# Patient Record
Sex: FEMALE | URBAN - METROPOLITAN AREA
[De-identification: names, ages, dates, MRNs, and addresses within clinical notes are randomized per-mention and may not be internally consistent; named-entity substitution may affect disease eponyms.]

---

## 2023-07-12 ENCOUNTER — NURSE TRIAGE (OUTPATIENT)
Dept: ADMINISTRATIVE | Facility: CLINIC | Age: 28
End: 2023-07-12

## 2023-07-12 NOTE — TELEPHONE ENCOUNTER
[place]    PCP:  Dr. Ascencion Pitts    She reports seeing a Specialist for wt loss and gastroparesis.  She reports having a lot of trouble eating at all.  C/O upper abdominal pain rated 3/10, loss of appetite, pain with eating/drinking, sweating d/t anxiety, ongoing back pain that is unchanged, dry mouth, fatigue, poor focus, symptoms are worse than normal, suspects dehydration, and nausea.  Toast with peanut butter for breakfast.  She reports haven't really been able to eat for the last 3 days.  She reports that she's tried everything.  Denies CP, difficulty breathing, diarrhea, fever, and urination pain.  Per protocol, care advised is go to the ED now.  Pt VU.  Advised to call for worsening/questions/concerns.  VU.  Unable to route d/t non-[facility] provider.    Reason for Disposition   Patient sounds very sick or weak to the triager    Additional Information   Negative: Passed out (i.e., fainted, collapsed and was not responding)   Negative: Shock suspected (e.g., cold/pale/clammy skin, too weak to stand, low BP, rapid pulse)   Negative: Visible sweat on face or sweat is dripping down   Negative: SEVERE abdominal pain (e.g., excruciating)   Negative: Pain lasting > 10 minutes and over 50 years old   Negative: Pain lasting > 10 minutes and over 40 years old and associated chest, arm, neck, upper back, or jaw pain   Negative: Pain lasting > 10 minutes and over 35 years old and at least one cardiac risk factor (i.e., hypertension, diabetes, obesity, smoker or strong family history of heart disease)   Negative: Pain lasting > 10 minutes and history of heart disease (i.e., heart attack, bypass surgery, angina, angioplasty, CHF)   Negative: Recent injury to the abdomen   Negative: Vomiting red blood or black (coffee ground) material   Negative: Bloody, black, or tarry bowel movements  (Exception: Chronic-unchanged black-grey bowel movements and is taking iron pills or Pepto-Bismol.)   Negative: Pregnant 20 weeks or more and new  hand or face swelling   Negative: Constant abdominal pain lasting > 2 hours   Negative: Vomiting bile (green color)    Protocols used: Abdominal Pain - Upper-A-[place]